# Patient Record
Sex: MALE | Race: WHITE | NOT HISPANIC OR LATINO | Employment: FULL TIME | URBAN - METROPOLITAN AREA
[De-identification: names, ages, dates, MRNs, and addresses within clinical notes are randomized per-mention and may not be internally consistent; named-entity substitution may affect disease eponyms.]

---

## 2022-05-11 ENCOUNTER — OFFICE VISIT (OUTPATIENT)
Dept: INTERNAL MEDICINE | Facility: CLINIC | Age: 52
End: 2022-05-11
Payer: COMMERCIAL

## 2022-05-11 ENCOUNTER — LAB VISIT (OUTPATIENT)
Dept: LAB | Facility: HOSPITAL | Age: 52
End: 2022-05-11
Attending: INTERNAL MEDICINE
Payer: COMMERCIAL

## 2022-05-11 VITALS
OXYGEN SATURATION: 98 % | HEIGHT: 66 IN | WEIGHT: 158.31 LBS | HEART RATE: 87 BPM | TEMPERATURE: 96 F | BODY MASS INDEX: 25.44 KG/M2 | SYSTOLIC BLOOD PRESSURE: 112 MMHG | DIASTOLIC BLOOD PRESSURE: 84 MMHG

## 2022-05-11 DIAGNOSIS — Z00.00 ROUTINE GENERAL MEDICAL EXAMINATION AT A HEALTH CARE FACILITY: ICD-10-CM

## 2022-05-11 DIAGNOSIS — Z00.00 ROUTINE GENERAL MEDICAL EXAMINATION AT A HEALTH CARE FACILITY: Primary | ICD-10-CM

## 2022-05-11 DIAGNOSIS — B35.1 ONYCHOMYCOSIS: ICD-10-CM

## 2022-05-11 DIAGNOSIS — L98.9 LESION OF SKIN OF NOSE: ICD-10-CM

## 2022-05-11 LAB
ALBUMIN SERPL BCP-MCNC: 4.3 G/DL (ref 3.5–5.2)
ALP SERPL-CCNC: 56 U/L (ref 55–135)
ALT SERPL W/O P-5'-P-CCNC: 34 U/L (ref 10–44)
ANION GAP SERPL CALC-SCNC: 8 MMOL/L (ref 8–16)
AST SERPL-CCNC: 32 U/L (ref 10–40)
BASOPHILS # BLD AUTO: 0.02 K/UL (ref 0–0.2)
BASOPHILS NFR BLD: 0.5 % (ref 0–1.9)
BILIRUB SERPL-MCNC: 0.5 MG/DL (ref 0.1–1)
BUN SERPL-MCNC: 22 MG/DL (ref 6–20)
CALCIUM SERPL-MCNC: 9.3 MG/DL (ref 8.7–10.5)
CHLORIDE SERPL-SCNC: 106 MMOL/L (ref 95–110)
CHOLEST SERPL-MCNC: 227 MG/DL (ref 120–199)
CHOLEST/HDLC SERPL: 4 {RATIO} (ref 2–5)
CO2 SERPL-SCNC: 28 MMOL/L (ref 23–29)
CREAT SERPL-MCNC: 1 MG/DL (ref 0.5–1.4)
DIFFERENTIAL METHOD: ABNORMAL
EOSINOPHIL # BLD AUTO: 0.1 K/UL (ref 0–0.5)
EOSINOPHIL NFR BLD: 1.8 % (ref 0–8)
ERYTHROCYTE [DISTWIDTH] IN BLOOD BY AUTOMATED COUNT: 12 % (ref 11.5–14.5)
EST. GFR  (AFRICAN AMERICAN): >60 ML/MIN/1.73 M^2
EST. GFR  (NON AFRICAN AMERICAN): >60 ML/MIN/1.73 M^2
GLUCOSE SERPL-MCNC: 99 MG/DL (ref 70–110)
HCT VFR BLD AUTO: 43.4 % (ref 40–54)
HDLC SERPL-MCNC: 57 MG/DL (ref 40–75)
HDLC SERPL: 25.1 % (ref 20–50)
HGB BLD-MCNC: 14.3 G/DL (ref 14–18)
IMM GRANULOCYTES # BLD AUTO: 0.01 K/UL (ref 0–0.04)
IMM GRANULOCYTES NFR BLD AUTO: 0.3 % (ref 0–0.5)
LDLC SERPL CALC-MCNC: 159.6 MG/DL (ref 63–159)
LYMPHOCYTES # BLD AUTO: 2 K/UL (ref 1–4.8)
LYMPHOCYTES NFR BLD: 52.1 % (ref 18–48)
MCH RBC QN AUTO: 30.7 PG (ref 27–31)
MCHC RBC AUTO-ENTMCNC: 32.9 G/DL (ref 32–36)
MCV RBC AUTO: 93 FL (ref 82–98)
MONOCYTES # BLD AUTO: 0.3 K/UL (ref 0.3–1)
MONOCYTES NFR BLD: 7.5 % (ref 4–15)
NEUTROPHILS # BLD AUTO: 1.5 K/UL (ref 1.8–7.7)
NEUTROPHILS NFR BLD: 37.8 % (ref 38–73)
NONHDLC SERPL-MCNC: 170 MG/DL
NRBC BLD-RTO: 0 /100 WBC
PLATELET # BLD AUTO: 243 K/UL (ref 150–450)
PMV BLD AUTO: 9.8 FL (ref 9.2–12.9)
POTASSIUM SERPL-SCNC: 4.7 MMOL/L (ref 3.5–5.1)
PROT SERPL-MCNC: 6.7 G/DL (ref 6–8.4)
RBC # BLD AUTO: 4.66 M/UL (ref 4.6–6.2)
SODIUM SERPL-SCNC: 142 MMOL/L (ref 136–145)
TESTOST SERPL-MCNC: 651 NG/DL (ref 304–1227)
TRIGL SERPL-MCNC: 52 MG/DL (ref 30–150)
TSH SERPL DL<=0.005 MIU/L-ACNC: 1.42 UIU/ML (ref 0.4–4)
WBC # BLD AUTO: 3.88 K/UL (ref 3.9–12.7)

## 2022-05-11 PROCEDURE — 80061 LIPID PANEL: CPT | Performed by: INTERNAL MEDICINE

## 2022-05-11 PROCEDURE — 99203 OFFICE O/P NEW LOW 30 MIN: CPT | Mod: 25,S$GLB,, | Performed by: INTERNAL MEDICINE

## 2022-05-11 PROCEDURE — 85025 COMPLETE CBC W/AUTO DIFF WBC: CPT | Performed by: INTERNAL MEDICINE

## 2022-05-11 PROCEDURE — 1159F PR MEDICATION LIST DOCUMENTED IN MEDICAL RECORD: ICD-10-PCS | Mod: CPTII,S$GLB,, | Performed by: INTERNAL MEDICINE

## 2022-05-11 PROCEDURE — 36415 COLL VENOUS BLD VENIPUNCTURE: CPT | Performed by: INTERNAL MEDICINE

## 2022-05-11 PROCEDURE — 84443 ASSAY THYROID STIM HORMONE: CPT | Performed by: INTERNAL MEDICINE

## 2022-05-11 PROCEDURE — 86803 HEPATITIS C AB TEST: CPT | Performed by: INTERNAL MEDICINE

## 2022-05-11 PROCEDURE — 99203 PR OFFICE/OUTPT VISIT, NEW, LEVL III, 30-44 MIN: ICD-10-PCS | Mod: 25,S$GLB,, | Performed by: INTERNAL MEDICINE

## 2022-05-11 PROCEDURE — 99999 PR PBB SHADOW E&M-NEW PATIENT-LVL IV: CPT | Mod: PBBFAC,,, | Performed by: INTERNAL MEDICINE

## 2022-05-11 PROCEDURE — 3074F PR MOST RECENT SYSTOLIC BLOOD PRESSURE < 130 MM HG: ICD-10-PCS | Mod: CPTII,S$GLB,, | Performed by: INTERNAL MEDICINE

## 2022-05-11 PROCEDURE — 3008F PR BODY MASS INDEX (BMI) DOCUMENTED: ICD-10-PCS | Mod: CPTII,S$GLB,, | Performed by: INTERNAL MEDICINE

## 2022-05-11 PROCEDURE — 99386 PR PREVENTIVE VISIT,NEW,40-64: ICD-10-PCS | Mod: 25,S$GLB,, | Performed by: INTERNAL MEDICINE

## 2022-05-11 PROCEDURE — 3079F PR MOST RECENT DIASTOLIC BLOOD PRESSURE 80-89 MM HG: ICD-10-PCS | Mod: CPTII,S$GLB,, | Performed by: INTERNAL MEDICINE

## 2022-05-11 PROCEDURE — 87389 HIV-1 AG W/HIV-1&-2 AB AG IA: CPT | Performed by: INTERNAL MEDICINE

## 2022-05-11 PROCEDURE — 99386 PREV VISIT NEW AGE 40-64: CPT | Mod: 25,S$GLB,, | Performed by: INTERNAL MEDICINE

## 2022-05-11 PROCEDURE — 3079F DIAST BP 80-89 MM HG: CPT | Mod: CPTII,S$GLB,, | Performed by: INTERNAL MEDICINE

## 2022-05-11 PROCEDURE — 1159F MED LIST DOCD IN RCRD: CPT | Mod: CPTII,S$GLB,, | Performed by: INTERNAL MEDICINE

## 2022-05-11 PROCEDURE — 99999 PR PBB SHADOW E&M-NEW PATIENT-LVL IV: ICD-10-PCS | Mod: PBBFAC,,, | Performed by: INTERNAL MEDICINE

## 2022-05-11 PROCEDURE — 84403 ASSAY OF TOTAL TESTOSTERONE: CPT | Performed by: INTERNAL MEDICINE

## 2022-05-11 PROCEDURE — 80053 COMPREHEN METABOLIC PANEL: CPT | Performed by: INTERNAL MEDICINE

## 2022-05-11 PROCEDURE — 3008F BODY MASS INDEX DOCD: CPT | Mod: CPTII,S$GLB,, | Performed by: INTERNAL MEDICINE

## 2022-05-11 PROCEDURE — 3074F SYST BP LT 130 MM HG: CPT | Mod: CPTII,S$GLB,, | Performed by: INTERNAL MEDICINE

## 2022-05-11 RX ORDER — ASPIRIN 81 MG/1
81 TABLET ORAL DAILY
COMMUNITY

## 2022-05-11 RX ORDER — ASCORBIC ACID 500 MG
500 TABLET ORAL DAILY
COMMUNITY

## 2022-05-11 RX ORDER — CHOLECALCIFEROL (VITAMIN D3) 25 MCG
1000 TABLET ORAL DAILY
COMMUNITY

## 2022-05-11 NOTE — PROGRESS NOTES
Subjective:      Patient ID: Lenny Quezada is a 51 y.o. male.    Chief Complaint: Annual Exam    HPI     52 yo with There is no problem list on file for this patient.    History reviewed. No pertinent past medical history.    Here today for annual prev exam.  Compliant with meds without significant side effects. Energy and appetite are good.     Also has multiple concerns:      Red spot to nose unchanged for months.   Denies trauma    A he also reports that he has had toenail fungus to his bilateral great toes for years.  No pain or tenderness.    He reports that he has a cardiac evaluation scheduled for May 24th.    He reports that he is up-to-date with his colonoscopy and repeat is due at 54 years old.    He also reports that he has had some intermittent insomnia.  Difficulty falling asleep.  Has some mind racing.    Past Surgical History:   Procedure Laterality Date    VASECTOMY  2011     Social History     Socioeconomic History    Marital status:    Tobacco Use    Smoking status: Never Smoker    Smokeless tobacco: Never Used   Substance and Sexual Activity    Alcohol use: Yes     Alcohol/week: 2.0 standard drinks     Types: 2 Cans of beer per week    Drug use: Never    Sexual activity: Yes     Partners: Female     Birth control/protection: None     family history includes Brain cancer in his brother; Cancer in his brother; Heart disease in his father; Hypertension in his brother; Prostate cancer in his father.  Father with coronary artery disease diagnosed at 36.     Review of Systems   Constitutional: Negative for activity change and unexpected weight change.   HENT: Negative for hearing loss, rhinorrhea and trouble swallowing.    Eyes: Negative for discharge and visual disturbance.   Respiratory: Negative for wheezing.    Cardiovascular: Negative for palpitations.   Gastrointestinal: Negative for blood in stool, constipation, diarrhea and vomiting.   Endocrine: Negative for polydipsia.  "  Genitourinary: Negative for difficulty urinating, hematuria and urgency.   Musculoskeletal: Negative for arthralgias, joint swelling and neck pain.   Neurological: Negative for weakness and headaches.   Psychiatric/Behavioral: Negative for confusion and dysphoric mood.     Objective:   /84 (BP Location: Right arm, Patient Position: Sitting, BP Method: Large (Manual))   Pulse 87   Temp 96 °F (35.6 °C) (Tympanic)   Ht 5' 6" (1.676 m)   Wt 71.8 kg (158 lb 4.6 oz)   SpO2 98%   BMI 25.55 kg/m²     Physical Exam  Constitutional:       General: He is not in acute distress.     Appearance: He is well-developed.   HENT:      Head: Normocephalic and atraumatic.   Eyes:      Pupils: Pupils are equal, round, and reactive to light.   Neck:      Thyroid: No thyromegaly.   Cardiovascular:      Rate and Rhythm: Normal rate and regular rhythm.   Pulmonary:      Breath sounds: Normal breath sounds. No wheezing or rales.   Abdominal:      General: Bowel sounds are normal.      Palpations: Abdomen is soft.      Tenderness: There is no abdominal tenderness.   Musculoskeletal:      Cervical back: Neck supple.   Lymphadenopathy:      Cervical: No cervical adenopathy.   Skin:     General: Skin is warm and dry.   Neurological:      Mental Status: He is alert and oriented to person, place, and time.   Psychiatric:         Behavior: Behavior normal.       Onychomycosis noted to toenails of bilateral great toes.  No discharge.  No tenderness.  No redness.    Assessment:     1. Routine general medical examination at a health care facility    2. Onychomycosis    3. Lesion of skin of nose      Plan:   Routine general medical examination at a health care facility  Heart healthy diet and regular exercise.  Health maintenance reviewed patient  -     Comprehensive Metabolic Panel; Future; Expected date: 05/11/2022  -     CBC Auto Differential; Future; Expected date: 05/11/2022  -     TSH; Future; Expected date: 05/11/2022  -     Lipid " Panel; Future; Expected date: 05/11/2022  -     Hepatitis C Antibody; Future; Expected date: 05/11/2022  -     HIV 1/2 Ag/Ab (4th Gen); Future; Expected date: 05/11/2022  -     Testosterone; Future; Expected date: 05/11/2022    Onychomycosis  He reports minimal effect with previously tried oral medication.  He would like a topical.  -     efinaconazole (JUBLIA) 10 % Felicity; Apply 1 application topically Daily. For up to 48 weeks.  Dispense: 8 mL; Refill: 0    Lesion of skin of nose  Pinpoint erythematous circular lesion to tip of nose.  -     Ambulatory referral/consult to Dermatology; Future; Expected date: 05/18/2022      Insomnia.  Discussed sleep hygiene.      Problem List Items Addressed This Visit    None     Visit Diagnoses     Routine general medical examination at a health care facility    -  Primary    Relevant Orders    Comprehensive Metabolic Panel (Completed)    CBC Auto Differential (Completed)    TSH (Completed)    Lipid Panel (Completed)    Hepatitis C Antibody    HIV 1/2 Ag/Ab (4th Gen)    Testosterone (Completed)    Onychomycosis        Relevant Medications    efinaconazole (JUBLIA) 10 % Felicity    Lesion of skin of nose        Relevant Orders    Ambulatory referral/consult to Dermatology          Follow up in about 1 year (around 5/11/2023), or if symptoms worsen or fail to improve.

## 2022-05-16 ENCOUNTER — PATIENT MESSAGE (OUTPATIENT)
Dept: ADMINISTRATIVE | Facility: HOSPITAL | Age: 52
End: 2022-05-16
Payer: COMMERCIAL

## 2022-05-16 LAB — HIV 1+2 AB+HIV1 P24 AG SERPL QL IA: NEGATIVE

## 2022-05-17 LAB — HCV AB SERPL QL IA: NEGATIVE

## 2022-06-02 ENCOUNTER — OFFICE VISIT (OUTPATIENT)
Dept: DERMATOLOGY | Facility: CLINIC | Age: 52
End: 2022-06-02
Payer: COMMERCIAL

## 2022-06-02 DIAGNOSIS — L81.4 LENTIGO: ICD-10-CM

## 2022-06-02 DIAGNOSIS — D22.9 MULTIPLE NEVI: ICD-10-CM

## 2022-06-02 DIAGNOSIS — L82.1 SEBORRHEIC KERATOSES: Primary | ICD-10-CM

## 2022-06-02 DIAGNOSIS — I78.1 TELANGIECTASIA: ICD-10-CM

## 2022-06-02 DIAGNOSIS — L98.9 LESION OF SKIN OF NOSE: ICD-10-CM

## 2022-06-02 PROCEDURE — 99999 PR PBB SHADOW E&M-EST. PATIENT-LVL III: ICD-10-PCS | Mod: PBBFAC,,, | Performed by: PHYSICIAN ASSISTANT

## 2022-06-02 PROCEDURE — 99999 PR PBB SHADOW E&M-EST. PATIENT-LVL III: CPT | Mod: PBBFAC,,, | Performed by: PHYSICIAN ASSISTANT

## 2022-06-02 PROCEDURE — 1160F RVW MEDS BY RX/DR IN RCRD: CPT | Mod: CPTII,S$GLB,, | Performed by: PHYSICIAN ASSISTANT

## 2022-06-02 PROCEDURE — 99202 OFFICE O/P NEW SF 15 MIN: CPT | Mod: S$GLB,,, | Performed by: PHYSICIAN ASSISTANT

## 2022-06-02 PROCEDURE — 1159F MED LIST DOCD IN RCRD: CPT | Mod: CPTII,S$GLB,, | Performed by: PHYSICIAN ASSISTANT

## 2022-06-02 PROCEDURE — 1159F PR MEDICATION LIST DOCUMENTED IN MEDICAL RECORD: ICD-10-PCS | Mod: CPTII,S$GLB,, | Performed by: PHYSICIAN ASSISTANT

## 2022-06-02 PROCEDURE — 1160F PR REVIEW ALL MEDS BY PRESCRIBER/CLIN PHARMACIST DOCUMENTED: ICD-10-PCS | Mod: CPTII,S$GLB,, | Performed by: PHYSICIAN ASSISTANT

## 2022-06-02 PROCEDURE — 99202 PR OFFICE/OUTPT VISIT, NEW, LEVL II, 15-29 MIN: ICD-10-PCS | Mod: S$GLB,,, | Performed by: PHYSICIAN ASSISTANT

## 2022-06-02 NOTE — PROGRESS NOTES
Subjective:       Patient ID:  Lenny Quezada is a 52 y.o. male who presents for   Chief Complaint   Patient presents with    Spot     C/o red spot on nose, no hx of skin cancer     History of Present Illness: The patient presents with chief complaint of spot.  Location: nose  Duration: 5 months  Signs/Symptoms: redness. Denies dryness, peeling, tenderness, change in size, or bleeding.     Prior treatments: none    Denies personal or FHX of skin CA      Review of Systems   Constitutional: Negative for fever and chills.   Gastrointestinal: Negative for nausea and vomiting.   Skin: Positive for activity-related sunscreen use. Negative for itching, rash, dry skin, sun sensitivity, daily sunscreen use, recent sunburn, dry lips and abscesses.   Hematologic/Lymphatic: Does not bruise/bleed easily.        Objective:    Physical Exam   Constitutional: He appears well-developed and well-nourished. No distress.   HENT:   Head:       Neurological: He is alert and oriented to person, place, and time. He is not disoriented.   Psychiatric: He has a normal mood and affect.   Skin:   Areas Examined (abnormalities noted in diagram):   Head / Face Inspection Performed  Neck Inspection Performed  Chest / Axilla Inspection Performed  Back Inspection Performed  RUE Inspected  LUE Inspection Performed                   Diagram Legend     Erythematous scaling macule/papule c/w actinic keratosis       Vascular papule c/w angioma      Pigmented verrucoid papule/plaque c/w seborrheic keratosis      Yellow umbilicated papule c/w sebaceous hyperplasia      Irregularly shaped tan macule c/w lentigo     1-2 mm smooth white papules consistent with Milia      Movable subcutaneous cyst with punctum c/w epidermal inclusion cyst      Subcutaneous movable cyst c/w pilar cyst      Firm pink to brown papule c/w dermatofibroma      Pedunculated fleshy papule(s) c/w skin tag(s)      Evenly pigmented macule c/w junctional nevus     Mildly variegated  pigmented, slightly irregular-bordered macule c/w mildly atypical nevus      Flesh colored to evenly pigmented papule c/w intradermal nevus       Pink pearly papule/plaque c/w basal cell carcinoma      Erythematous hyperkeratotic cursted plaque c/w SCC      Surgical scar with no sign of skin cancer recurrence      Open and closed comedones      Inflammatory papules and pustules      Verrucoid papule consistent consistent with wart     Erythematous eczematous patches and plaques     Dystrophic onycholytic nail with subungual debris c/w onychomycosis     Umbilicated papule    Erythematous-base heme-crusted tan verrucoid plaque consistent with inflamed seborrheic keratosis     Erythematous Silvery Scaling Plaque c/w Psoriasis     See annotation      Assessment / Plan:        Seborrheic keratoses  Reassurance given.  Lesions are benign.    Lesion of skin of nose  -     Ambulatory referral/consult to Dermatology    Telangiectasia  Discussed dx and provided reassurance. Encouraged daily spf.    Lentigo  Encouraged photoprotection daily for sun exposed skin.    Multiple nevi  Reviewed ABCDEFs of moles, encouraged regular         Follow up in about 1 year (around 6/2/2023) for Full Skin Exam.

## 2022-11-30 ENCOUNTER — PATIENT MESSAGE (OUTPATIENT)
Dept: INTERNAL MEDICINE | Facility: CLINIC | Age: 52
End: 2022-11-30
Payer: COMMERCIAL

## 2022-12-14 ENCOUNTER — TELEPHONE (OUTPATIENT)
Dept: INTERNAL MEDICINE | Facility: CLINIC | Age: 52
End: 2022-12-14
Payer: COMMERCIAL

## 2022-12-14 DIAGNOSIS — B35.1 ONYCHOMYCOSIS: Primary | ICD-10-CM

## 2022-12-14 NOTE — TELEPHONE ENCOUNTER
----- Message from Lexi Hernandez sent at 12/14/2022 10:19 AM CST -----  Pt would like a call back regarding efinaconazole (JUBLIA) 10 % Felicity that was prescribed. Pt stated the cost is $1000 and he would like the pills instead. Call pt back at [phone] to advise. Fly. EL

## 2022-12-20 RX ORDER — CICLOPIROX 80 MG/ML
SOLUTION TOPICAL
Qty: 6.6 ML | Refills: 0 | Status: SHIPPED | OUTPATIENT
Start: 2022-12-20 | End: 2023-02-06

## 2022-12-21 ENCOUNTER — PATIENT MESSAGE (OUTPATIENT)
Dept: INTERNAL MEDICINE | Facility: CLINIC | Age: 52
End: 2022-12-21
Payer: COMMERCIAL

## 2022-12-21 NOTE — TELEPHONE ENCOUNTER
Sent ciclopirox topical which may be cheaper than jublia. Let me know if oral medication is needed.

## 2023-04-03 DIAGNOSIS — B35.1 ONYCHOMYCOSIS: ICD-10-CM

## 2023-04-04 RX ORDER — CICLOPIROX 80 MG/ML
SOLUTION TOPICAL
Qty: 6.6 ML | Refills: 0 | Status: SHIPPED | OUTPATIENT
Start: 2023-04-04 | End: 2023-05-12

## 2023-05-12 DIAGNOSIS — B35.1 ONYCHOMYCOSIS: ICD-10-CM

## 2023-05-12 RX ORDER — CICLOPIROX 80 MG/ML
SOLUTION TOPICAL
Qty: 6.6 ML | Refills: 0 | Status: SHIPPED | OUTPATIENT
Start: 2023-05-12 | End: 2023-07-03

## 2023-05-12 NOTE — TELEPHONE ENCOUNTER
Refill Routing Note   Medication(s) are not appropriate for processing by [facility] for the following reason(s):      Medication outside of protocol    [facility] action(s):  Route None identified          Appointments  past 12m or future 3m with PCP    Date Provider   Last Visit   5/11/2022 Edward Flores MD   Next Visit   Visit date not found Edward Flores MD   ED visits in past 90 days: 0        Note composed:3:21 PM 05/12/2023

## 2023-06-06 ENCOUNTER — LAB VISIT (OUTPATIENT)
Dept: LAB | Facility: HOSPITAL | Age: 53
End: 2023-06-06
Attending: INTERNAL MEDICINE
Payer: COMMERCIAL

## 2023-06-06 ENCOUNTER — OFFICE VISIT (OUTPATIENT)
Dept: INTERNAL MEDICINE | Facility: CLINIC | Age: 53
End: 2023-06-06
Payer: COMMERCIAL

## 2023-06-06 VITALS
WEIGHT: 160.06 LBS | BODY MASS INDEX: 25.72 KG/M2 | SYSTOLIC BLOOD PRESSURE: 118 MMHG | HEART RATE: 69 BPM | TEMPERATURE: 96 F | HEIGHT: 66 IN | DIASTOLIC BLOOD PRESSURE: 86 MMHG | OXYGEN SATURATION: 98 %

## 2023-06-06 DIAGNOSIS — Z00.00 ROUTINE GENERAL MEDICAL EXAMINATION AT A HEALTH CARE FACILITY: Primary | ICD-10-CM

## 2023-06-06 DIAGNOSIS — Z00.00 ROUTINE GENERAL MEDICAL EXAMINATION AT A HEALTH CARE FACILITY: ICD-10-CM

## 2023-06-06 DIAGNOSIS — B35.1 ONYCHOMYCOSIS: ICD-10-CM

## 2023-06-06 DIAGNOSIS — Z82.49 FAMILY HISTORY OF EARLY CAD: ICD-10-CM

## 2023-06-06 DIAGNOSIS — E78.5 HYPERLIPIDEMIA, UNSPECIFIED HYPERLIPIDEMIA TYPE: ICD-10-CM

## 2023-06-06 LAB
ALBUMIN SERPL BCP-MCNC: 4.2 G/DL (ref 3.5–5.2)
ALP SERPL-CCNC: 73 U/L (ref 55–135)
ALT SERPL W/O P-5'-P-CCNC: 42 U/L (ref 10–44)
ANION GAP SERPL CALC-SCNC: 8 MMOL/L (ref 8–16)
AST SERPL-CCNC: 32 U/L (ref 10–40)
BASOPHILS # BLD AUTO: 0.03 K/UL (ref 0–0.2)
BASOPHILS NFR BLD: 0.6 % (ref 0–1.9)
BILIRUB SERPL-MCNC: 0.5 MG/DL (ref 0.1–1)
BUN SERPL-MCNC: 17 MG/DL (ref 6–20)
CALCIUM SERPL-MCNC: 9.4 MG/DL (ref 8.7–10.5)
CHLORIDE SERPL-SCNC: 105 MMOL/L (ref 95–110)
CHOLEST SERPL-MCNC: 175 MG/DL (ref 120–199)
CHOLEST/HDLC SERPL: 3.2 {RATIO} (ref 2–5)
CO2 SERPL-SCNC: 27 MMOL/L (ref 23–29)
COMPLEXED PSA SERPL-MCNC: 1.1 NG/ML (ref 0–4)
CREAT SERPL-MCNC: 1 MG/DL (ref 0.5–1.4)
DIFFERENTIAL METHOD: NORMAL
EOSINOPHIL # BLD AUTO: 0.2 K/UL (ref 0–0.5)
EOSINOPHIL NFR BLD: 3.1 % (ref 0–8)
ERYTHROCYTE [DISTWIDTH] IN BLOOD BY AUTOMATED COUNT: 12.3 % (ref 11.5–14.5)
EST. GFR  (NO RACE VARIABLE): >60 ML/MIN/1.73 M^2
ESTIMATED AVG GLUCOSE: 103 MG/DL (ref 68–131)
GLUCOSE SERPL-MCNC: 97 MG/DL (ref 70–110)
HBA1C MFR BLD: 5.2 % (ref 4–5.6)
HCT VFR BLD AUTO: 45.2 % (ref 40–54)
HDLC SERPL-MCNC: 54 MG/DL (ref 40–75)
HDLC SERPL: 30.9 % (ref 20–50)
HGB BLD-MCNC: 14.6 G/DL (ref 14–18)
IMM GRANULOCYTES # BLD AUTO: 0.01 K/UL (ref 0–0.04)
IMM GRANULOCYTES NFR BLD AUTO: 0.2 % (ref 0–0.5)
LDLC SERPL CALC-MCNC: 110.4 MG/DL (ref 63–159)
LYMPHOCYTES # BLD AUTO: 2.6 K/UL (ref 1–4.8)
LYMPHOCYTES NFR BLD: 47 % (ref 18–48)
MCH RBC QN AUTO: 30.4 PG (ref 27–31)
MCHC RBC AUTO-ENTMCNC: 32.3 G/DL (ref 32–36)
MCV RBC AUTO: 94 FL (ref 82–98)
MONOCYTES # BLD AUTO: 0.4 K/UL (ref 0.3–1)
MONOCYTES NFR BLD: 7.2 % (ref 4–15)
NEUTROPHILS # BLD AUTO: 2.3 K/UL (ref 1.8–7.7)
NEUTROPHILS NFR BLD: 41.9 % (ref 38–73)
NONHDLC SERPL-MCNC: 121 MG/DL
NRBC BLD-RTO: 0 /100 WBC
PLATELET # BLD AUTO: 244 K/UL (ref 150–450)
PMV BLD AUTO: 10 FL (ref 9.2–12.9)
POTASSIUM SERPL-SCNC: 4.4 MMOL/L (ref 3.5–5.1)
PROT SERPL-MCNC: 6.9 G/DL (ref 6–8.4)
RBC # BLD AUTO: 4.81 M/UL (ref 4.6–6.2)
SODIUM SERPL-SCNC: 140 MMOL/L (ref 136–145)
TRIGL SERPL-MCNC: 53 MG/DL (ref 30–150)
TSH SERPL DL<=0.005 MIU/L-ACNC: 1.1 UIU/ML (ref 0.4–4)
WBC # BLD AUTO: 5.43 K/UL (ref 3.9–12.7)

## 2023-06-06 PROCEDURE — 99396 PREV VISIT EST AGE 40-64: CPT | Mod: S$GLB,,, | Performed by: INTERNAL MEDICINE

## 2023-06-06 PROCEDURE — 1159F MED LIST DOCD IN RCRD: CPT | Mod: CPTII,S$GLB,, | Performed by: INTERNAL MEDICINE

## 2023-06-06 PROCEDURE — 3008F PR BODY MASS INDEX (BMI) DOCUMENTED: ICD-10-PCS | Mod: CPTII,S$GLB,, | Performed by: INTERNAL MEDICINE

## 2023-06-06 PROCEDURE — 3074F SYST BP LT 130 MM HG: CPT | Mod: CPTII,S$GLB,, | Performed by: INTERNAL MEDICINE

## 2023-06-06 PROCEDURE — 85025 COMPLETE CBC W/AUTO DIFF WBC: CPT | Performed by: INTERNAL MEDICINE

## 2023-06-06 PROCEDURE — 84153 ASSAY OF PSA TOTAL: CPT | Performed by: INTERNAL MEDICINE

## 2023-06-06 PROCEDURE — 83036 HEMOGLOBIN GLYCOSYLATED A1C: CPT | Performed by: INTERNAL MEDICINE

## 2023-06-06 PROCEDURE — 3079F DIAST BP 80-89 MM HG: CPT | Mod: CPTII,S$GLB,, | Performed by: INTERNAL MEDICINE

## 2023-06-06 PROCEDURE — 80053 COMPREHEN METABOLIC PANEL: CPT | Performed by: INTERNAL MEDICINE

## 2023-06-06 PROCEDURE — 3079F PR MOST RECENT DIASTOLIC BLOOD PRESSURE 80-89 MM HG: ICD-10-PCS | Mod: CPTII,S$GLB,, | Performed by: INTERNAL MEDICINE

## 2023-06-06 PROCEDURE — 3008F BODY MASS INDEX DOCD: CPT | Mod: CPTII,S$GLB,, | Performed by: INTERNAL MEDICINE

## 2023-06-06 PROCEDURE — 3074F PR MOST RECENT SYSTOLIC BLOOD PRESSURE < 130 MM HG: ICD-10-PCS | Mod: CPTII,S$GLB,, | Performed by: INTERNAL MEDICINE

## 2023-06-06 PROCEDURE — 99396 PR PREVENTIVE VISIT,EST,40-64: ICD-10-PCS | Mod: S$GLB,,, | Performed by: INTERNAL MEDICINE

## 2023-06-06 PROCEDURE — 36415 COLL VENOUS BLD VENIPUNCTURE: CPT | Performed by: INTERNAL MEDICINE

## 2023-06-06 PROCEDURE — 80061 LIPID PANEL: CPT | Performed by: INTERNAL MEDICINE

## 2023-06-06 PROCEDURE — 99999 PR PBB SHADOW E&M-EST. PATIENT-LVL IV: ICD-10-PCS | Mod: PBBFAC,,, | Performed by: INTERNAL MEDICINE

## 2023-06-06 PROCEDURE — 84443 ASSAY THYROID STIM HORMONE: CPT | Performed by: INTERNAL MEDICINE

## 2023-06-06 PROCEDURE — 1159F PR MEDICATION LIST DOCUMENTED IN MEDICAL RECORD: ICD-10-PCS | Mod: CPTII,S$GLB,, | Performed by: INTERNAL MEDICINE

## 2023-06-06 PROCEDURE — 99999 PR PBB SHADOW E&M-EST. PATIENT-LVL IV: CPT | Mod: PBBFAC,,, | Performed by: INTERNAL MEDICINE

## 2023-06-06 RX ORDER — CALCIUM CARBONATE 300MG(750)
TABLET,CHEWABLE ORAL
COMMUNITY
Start: 2022-09-01

## 2023-06-06 NOTE — PROGRESS NOTES
Subjective:      Patient ID: Lenny Quezada is a 53 y.o. male.    Chief Complaint: Annual Exam    HPI      53 y.o. with  Patient Active Problem List   Diagnosis    Routine general medical examination at a health care facility    Hyperlipidemia    Family history of early CAD     History reviewed. No pertinent past medical history.    Here today for annual prev exam.  Compliant with meds without significant side effects. Energy and appetite are good.         Past Surgical History:   Procedure Laterality Date    VASECTOMY  2011     Social History     Socioeconomic History    Marital status:    Tobacco Use    Smoking status: Never    Smokeless tobacco: Never   Substance and Sexual Activity    Alcohol use: Yes     Alcohol/week: 4.0 standard drinks     Types: 4 Cans of beer per week    Drug use: Never    Sexual activity: Yes     Partners: Female     Birth control/protection: None     family history includes Brain cancer in his brother; Cancer in his brother and father; Heart disease in his father; Hypertension in his brother; Prostate cancer in his father.    Review of Systems   Constitutional:  Negative for activity change and unexpected weight change.   HENT:  Negative for hearing loss, rhinorrhea and trouble swallowing.    Eyes:  Negative for discharge and visual disturbance.   Respiratory:  Negative for chest tightness and wheezing.    Cardiovascular:  Negative for palpitations.   Gastrointestinal:  Negative for blood in stool, constipation, diarrhea and vomiting.   Endocrine: Negative for polydipsia and polyuria.   Genitourinary:  Negative for difficulty urinating, hematuria and urgency.   Musculoskeletal:  Negative for joint swelling and neck pain.   Neurological:  Negative for weakness and headaches.   Psychiatric/Behavioral:  Negative for confusion and dysphoric mood.    Objective:   /86 (BP Location: Left arm, Patient Position: Sitting, BP Method: Large (Manual))   Pulse 69   Temp 96.2 °F (35.7  "°C) (Tympanic)   Ht 5' 6" (1.676 m)   Wt 72.6 kg (160 lb 0.9 oz)   SpO2 98%   BMI 25.83 kg/m²     Physical Exam  Constitutional:       General: He is not in acute distress.     Appearance: He is well-developed.   HENT:      Head: Normocephalic and atraumatic.   Eyes:      Extraocular Movements: Extraocular movements intact.   Neck:      Thyroid: No thyromegaly.   Cardiovascular:      Rate and Rhythm: Normal rate and regular rhythm.   Pulmonary:      Breath sounds: Normal breath sounds. No wheezing or rales.   Abdominal:      General: Bowel sounds are normal.      Palpations: Abdomen is soft.      Tenderness: There is no abdominal tenderness.   Musculoskeletal:         General: No swelling.      Cervical back: Neck supple. No rigidity.   Lymphadenopathy:      Cervical: No cervical adenopathy.   Skin:     General: Skin is warm and dry.   Neurological:      Mental Status: He is alert and oriented to person, place, and time.   Psychiatric:         Behavior: Behavior normal.       Lab Results   Component Value Date    WBC 3.88 (L) 05/11/2022    HGB 14.3 05/11/2022    HCT 43.4 05/11/2022    MCV 93 05/11/2022     05/11/2022    CHOL 227 (H) 05/11/2022    TRIG 52 05/11/2022    HDL 57 05/11/2022    LDLCALC 159.6 (H) 05/11/2022    ALT 34 05/11/2022    AST 32 05/11/2022     05/11/2022    K 4.7 05/11/2022    CALCIUM 9.3 05/11/2022     05/11/2022    CO2 28 05/11/2022    BUN 22 (H) 05/11/2022    CREATININE 1.0 05/11/2022    TSH 1.415 05/11/2022    GLU 99 05/11/2022    TOTALTESTOST 651 05/11/2022          The 10-year ASCVD risk score (Dane TORO, et al., 2019) is: 4.6%    Values used to calculate the score:      Age: 53 years      Sex: Male      Is Non- : No      Diabetic: No      Tobacco smoker: No      Systolic Blood Pressure: 118 mmHg      Is BP treated: No      HDL Cholesterol: 57 mg/dL      Total Cholesterol: 227 mg/dL     Assessment:     1. Routine general medical examination at a " health care facility    2. Hyperlipidemia, unspecified hyperlipidemia type    3. Onychomycosis    4. Family history of early CAD      Plan:   1. Routine general medical examination at a health care facility  Overview:  Heart healthy diet, regular exercise, and regular use of sunscreen.    reviewed  He reports that he is up-to-date with his colonoscopy and repeat is due at 54 years old.  Reports calcium score of 6 with Dr. Pipe Flores in 2022.     Orders:  -     Comprehensive Metabolic Panel; Future; Expected date: 06/06/2023  -     CBC Auto Differential; Future; Expected date: 06/06/2023  -     TSH; Future; Expected date: 06/06/2023  -     Lipid Panel; Future; Expected date: 06/06/2023  -     Hemoglobin A1C; Future; Expected date: 06/06/2023  -     PSA, Screening; Future; Expected date: 06/06/2023    2. Hyperlipidemia, unspecified hyperlipidemia type  Overview:  Statin initiated by Dr. Pipe Flores 2022.       3. Onychomycosis    4. Family history of early CAD  Overview:  Father with MI in his 30s.       Reports improving with penlac    Patient Instructions   Check with your pharmacy regarding new shingles vaccine.      No future appointments.          Follow up in about 1 year (around 6/6/2024), or if symptoms worsen or fail to improve.  Please get last PN and coronary artery calcium score test from Dr. Pipe Flores, Cardiologist.

## 2023-06-16 ENCOUNTER — PATIENT MESSAGE (OUTPATIENT)
Dept: ADMINISTRATIVE | Facility: HOSPITAL | Age: 53
End: 2023-06-16
Payer: COMMERCIAL

## 2023-06-30 DIAGNOSIS — B35.1 ONYCHOMYCOSIS: ICD-10-CM

## 2023-07-03 RX ORDER — CICLOPIROX 80 MG/ML
SOLUTION TOPICAL
Qty: 6.6 ML | Refills: 0 | Status: SHIPPED | OUTPATIENT
Start: 2023-07-03 | End: 2023-08-22

## 2023-08-21 DIAGNOSIS — B35.1 ONYCHOMYCOSIS: ICD-10-CM

## 2023-08-22 RX ORDER — CICLOPIROX 80 MG/ML
SOLUTION TOPICAL
Qty: 6.6 ML | Refills: 1 | Status: SHIPPED | OUTPATIENT
Start: 2023-08-22

## 2023-09-11 PROBLEM — Z00.00 ROUTINE GENERAL MEDICAL EXAMINATION AT A HEALTH CARE FACILITY: Status: RESOLVED | Noted: 2023-06-06 | Resolved: 2023-09-11

## 2024-02-06 DIAGNOSIS — Z12.11 COLON CANCER SCREENING: ICD-10-CM

## 2025-04-03 ENCOUNTER — OFFICE VISIT (OUTPATIENT)
Dept: INTERNAL MEDICINE | Facility: CLINIC | Age: 55
End: 2025-04-03
Payer: COMMERCIAL

## 2025-04-03 VITALS
SYSTOLIC BLOOD PRESSURE: 116 MMHG | BODY MASS INDEX: 25.51 KG/M2 | OXYGEN SATURATION: 98 % | RESPIRATION RATE: 18 BRPM | DIASTOLIC BLOOD PRESSURE: 86 MMHG | TEMPERATURE: 98 F | HEIGHT: 66 IN | WEIGHT: 158.75 LBS | HEART RATE: 96 BPM

## 2025-04-03 DIAGNOSIS — B35.1 ONYCHOMYCOSIS OF TOENAIL: ICD-10-CM

## 2025-04-03 DIAGNOSIS — Z12.5 SCREENING PSA (PROSTATE SPECIFIC ANTIGEN): ICD-10-CM

## 2025-04-03 DIAGNOSIS — E78.00 PURE HYPERCHOLESTEROLEMIA: Chronic | ICD-10-CM

## 2025-04-03 DIAGNOSIS — Z00.00 PREVENTATIVE HEALTH CARE: Primary | ICD-10-CM

## 2025-04-03 DIAGNOSIS — M25.521 CHRONIC ELBOW PAIN, RIGHT: ICD-10-CM

## 2025-04-03 DIAGNOSIS — Z13.1 SCREENING FOR DIABETES MELLITUS: ICD-10-CM

## 2025-04-03 DIAGNOSIS — Z13.220 ENCOUNTER FOR LIPID SCREENING FOR CARDIOVASCULAR DISEASE: ICD-10-CM

## 2025-04-03 DIAGNOSIS — Z82.49 FAMILY HISTORY OF EARLY CAD: Chronic | ICD-10-CM

## 2025-04-03 DIAGNOSIS — Z23 NEED FOR VACCINATION: ICD-10-CM

## 2025-04-03 DIAGNOSIS — Z13.6 ENCOUNTER FOR LIPID SCREENING FOR CARDIOVASCULAR DISEASE: ICD-10-CM

## 2025-04-03 DIAGNOSIS — G89.29 CHRONIC ELBOW PAIN, RIGHT: ICD-10-CM

## 2025-04-03 PROBLEM — E78.5 HYPERLIPIDEMIA: Status: RESOLVED | Noted: 2023-06-06 | Resolved: 2025-04-03

## 2025-04-03 LAB
ALBUMIN SERPL BCP-MCNC: 4.3 G/DL (ref 3.5–5.2)
ALP SERPL-CCNC: 75 UNIT/L (ref 40–150)
ALT SERPL W/O P-5'-P-CCNC: 28 UNIT/L (ref 10–44)
ANION GAP (OHS): 10 MMOL/L (ref 8–16)
AST SERPL-CCNC: 27 UNIT/L (ref 11–45)
BILIRUB SERPL-MCNC: 0.5 MG/DL (ref 0.1–1)
BUN SERPL-MCNC: 21 MG/DL (ref 6–20)
CALCIUM SERPL-MCNC: 9.5 MG/DL (ref 8.7–10.5)
CHLORIDE SERPL-SCNC: 104 MMOL/L (ref 95–110)
CHOLEST SERPL-MCNC: 220 MG/DL (ref 120–199)
CHOLEST/HDLC SERPL: 3.9 {RATIO} (ref 2–5)
CO2 SERPL-SCNC: 26 MMOL/L (ref 23–29)
CREAT SERPL-MCNC: 1 MG/DL (ref 0.5–1.4)
GFR SERPLBLD CREATININE-BSD FMLA CKD-EPI: >60 ML/MIN/1.73/M2
GLUCOSE SERPL-MCNC: 82 MG/DL (ref 70–110)
HDLC SERPL-MCNC: 57 MG/DL (ref 40–75)
HDLC SERPL: 25.9 % (ref 20–50)
LDLC SERPL CALC-MCNC: 151.4 MG/DL (ref 63–159)
NONHDLC SERPL-MCNC: 163 MG/DL
POTASSIUM SERPL-SCNC: 4.5 MMOL/L (ref 3.5–5.1)
PROT SERPL-MCNC: 7.5 GM/DL (ref 6–8.4)
SODIUM SERPL-SCNC: 140 MMOL/L (ref 136–145)
TRIGL SERPL-MCNC: 58 MG/DL (ref 30–150)

## 2025-04-03 PROCEDURE — 80053 COMPREHEN METABOLIC PANEL: CPT | Performed by: FAMILY MEDICINE

## 2025-04-03 PROCEDURE — 99999 PR PBB SHADOW E&M-EST. PATIENT-LVL V: CPT | Mod: PBBFAC,,, | Performed by: FAMILY MEDICINE

## 2025-04-03 PROCEDURE — 36415 COLL VENOUS BLD VENIPUNCTURE: CPT | Performed by: FAMILY MEDICINE

## 2025-04-03 PROCEDURE — 80061 LIPID PANEL: CPT | Performed by: FAMILY MEDICINE

## 2025-04-03 PROCEDURE — 84153 ASSAY OF PSA TOTAL: CPT | Performed by: FAMILY MEDICINE

## 2025-04-03 RX ORDER — CICLOPIROX 80 MG/ML
SOLUTION TOPICAL NIGHTLY
Qty: 6.6 ML | Refills: 2 | Status: SHIPPED | OUTPATIENT
Start: 2025-04-03 | End: 2025-07-02

## 2025-04-03 RX ORDER — TERBINAFINE HYDROCHLORIDE 250 MG/1
250 TABLET ORAL DAILY
Qty: 30 TABLET | Refills: 0 | Status: SHIPPED | OUTPATIENT
Start: 2025-04-03 | End: 2025-05-03

## 2025-04-03 NOTE — PROGRESS NOTES
ANNUAL WELLNESS VISIT (PREVENTIVE SERVICES)  4/3/25  9:40 AM CDT    CHIEF COMPLAINT: Establish Care    HEALTH MAINTENANCE INTERVENTIONS - UP TO DATE  Health Maintenance Topics with due status: Not Due       Topic Last Completion Date    TETANUS VACCINE 03/24/2021    Hemoglobin A1c (Diabetic Prevention Screening) 06/06/2023    Lipid Panel 06/06/2023    Colorectal Cancer Screening 10/25/2024    RSV Vaccine (Age 60+ and Pregnant patients) Not Due     HEALTH MAINTENANCE INTERVENTIONS - DUE OR DUE SOON  Health Maintenance Due   Topic Date Due    Shingles Vaccine (1 of 2) Never done    Pneumococcal Vaccines (Age 50+) (1 of 1 - PCV) Never done    Influenza Vaccine (1) 09/01/2024    COVID-19 Vaccine (3 - 2024-25 season) 09/01/2024     Social Drivers of Health     Tobacco Use: Low Risk  (4/3/2025)    Patient History     Smoking Tobacco Use: Never     Smokeless Tobacco Use: Never     Passive Exposure: Not on file   Alcohol Use: Alcohol Misuse (3/28/2025)    AUDIT-C     Frequency of Alcohol Consumption: 2-4 times a month     Average Number of Drinks: 3 or 4     Frequency of Binge Drinking: Less than monthly   Financial Resource Strain: Low Risk  (3/28/2025)    Overall Financial Resource Strain (CARDIA)     Difficulty of Paying Living Expenses: Not hard at all   Food Insecurity: No Food Insecurity (3/28/2025)    Hunger Vital Sign     Worried About Running Out of Food in the Last Year: Never true     Ran Out of Food in the Last Year: Never true   Transportation Needs: No Transportation Needs (3/28/2025)    PRAPARE - Transportation     Lack of Transportation (Medical): No     Lack of Transportation (Non-Medical): No   Physical Activity: Sufficiently Active (3/28/2025)    Exercise Vital Sign     Days of Exercise per Week: 5 days     Minutes of Exercise per Session: 60 min   Stress: No Stress Concern Present (3/28/2025)    [employer] [place] [employer] - Occupational Stress Questionnaire     Feeling of Stress : Only a  little   Housing Stability: Low Risk  (3/28/2025)    Housing Stability Vital Sign     Unable to Pay for Housing in the Last Year: No     Number of Times Moved in the Last Year: 0     Homeless in the Last Year: No   Depression: Low Risk  (4/3/2025)    Depression     Last PHQ-4: Flowsheet Data: 0   Utilities: Not At Risk (3/28/2025)    [employer] Utilities     Threatened with loss of utilities: No   Health Literacy: Adequate Health Literacy (3/28/2025)     Health Literacy     Frequency of need for help with medical instructions: Never   Social Isolation: Not on file     This is my first time treating Lenny.  Lenny is requesting that I assume the role of their primary care provider.    Lenny is here for annual wellness and preventive services visit.  Health maintenance care gaps reviewed; relevant closure actions recommended.  CDC-recommended immunizations advocated.  USPSTF-recommended screenings advocated.       Lenny is also here for evaluation and management of unrelated problems reported separately.      1. Preventative health care  -     PSA, Screening  -     Comprehensive Metabolic Panel  -     Lipid Panel    2. Encounter for lipid screening for cardiovascular disease  -     Lipid Panel    3. Screening for diabetes mellitus  -     Comprehensive Metabolic Panel    4. Screening PSA (prostate specific antigen)  -     PSA, Screening    5. Chronic elbow pain, right  -     Ambulatory referral/consult to Orthopedics; Future; Expected date: 04/10/2025  -     X-Ray Elbow Complete 3 view Right; Future; Expected date: 04/03/2025    6. Onychomycosis of toenail  -     terbinafine HCL (LAMISIL) 250 mg tablet; Take 1 tablet (250 mg total) by mouth once daily.  Dispense: 30 tablet; Refill: 0  -     ciclopirox (PENLAC) 8 % Soln; Apply topically nightly. Continue for three months.  Dispense: 6.6 mL; Refill: 2    7. Pure hypercholesterolemia    8. Family history of early CAD  Overview:  CARDIOLOGIST: Gerry Flores MD  Father with  "MI in his 30s.       9. Need for vaccination  -     varicella-zoster [id number], PF, (SHINGRIX) 50 mcg/0.5 mL injection; Inject 0.5 mL (one dose) into muscle now; schedule second dose  days later  Dispense: 1 each; Refill: 1         Review of Systems   Respiratory:  Negative for chest tightness and shortness of breath.    Cardiovascular:  Negative for chest pain.   Endocrine: Negative for polydipsia and polyuria.       Vitals:    04/03/25 1002   BP: 116/86   BP Location: Left arm   Patient Position: Sitting   Pulse: 96   Resp: 18   Temp: 98 °F (36.7 °C)   TempSrc: Oral   SpO2: 98%   Weight: 72 kg (158 lb 11.7 oz)   Height: 5' 6" (1.676 m)   Physical Exam  Vitals reviewed.   Constitutional:       General: He is not in acute distress.     Appearance: Normal appearance. He is not ill-appearing, toxic-appearing or diaphoretic.   HENT:      Head: Normocephalic and atraumatic.      Right Ear: Tympanic membrane, ear canal and external ear normal.      Left Ear: Tympanic membrane, ear canal and external ear normal.   Eyes:      General: No scleral icterus.     Conjunctiva/sclera: Conjunctivae normal.   Neck:      Vascular: No carotid bruit.   Cardiovascular:      Rate and Rhythm: Normal rate and regular rhythm.      Heart sounds: Normal heart sounds.   Pulmonary:      Effort: Pulmonary effort is normal.      Breath sounds: Normal breath sounds.   Abdominal:      General: Bowel sounds are normal. There is no distension.      Palpations: Abdomen is soft. There is no mass.      Tenderness: There is no abdominal tenderness.   Musculoskeletal:         General: No swelling, tenderness or deformity. Normal range of motion.      Cervical back: No muscular tenderness.   Lymphadenopathy:      Cervical: No cervical adenopathy.   Skin:     General: Skin is warm and dry.      Coloration: Skin is not jaundiced.   Neurological:      General: No focal deficit present.      Mental Status: He is alert and oriented to person, place, and " time.      Cranial Nerves: No cranial nerve deficit.      Gait: Gait normal.   Psychiatric:         Mood and Affect: Mood normal.         Behavior: Behavior normal.         Judgment: Judgment normal.         ADDITIONAL E&M SERVICES PROVIDED - UNRELATED TO PREVENTIVE SERVICES  4/3/25  9:40 AM CDT    In addition to and unrelated to the preventive services provided this date, the following condition(s) were also evaluated and managed.    History of Present Illness      He's very active, going to the gym multiple days per week and exercising regularly, including strength and cardio. He was playing pickleball regularly until about three months ago when he sustained an injury to his right elbow. It sounds like tennis elbow, but there are also aspects that suggest possible ulnar nerve entrapment. He says he's 90% better, given that it has been going on for so long. I recommended an orthopedic consultation. He declined but said he would see an orthopedist if it did not completely resolve and he was not able to successfully resume playing pickleball. I entered orders for an X-ray and referral, and he can schedule them at his convenience.    He has a family history of premature coronary artery disease. He has seen outside cardiologists and said his coronary calcium CT score was six, which is within the very good range. He has hypercholesterolemia. He may have previously been treated with rosuvastatin, but he's been off the rosuvastatin for over a year.    He reports that he has culture-confirmed onychomycosis of the toenails of his right foot, treated years ago by a dermatologist with terbinafine, with near but incomplete resolution. He has tried topical treatments over the last year and found them ineffective. We discussed the risks and benefits of treatment options, and it was agreed to combine topical and systemic antifungals. He will let me know if this fails to resolve the problem.    Problem List Items Addressed This  "Visit       Family history of early CAD (Chronic)    Overview   CARDIOLOGIST: Gerry Flores MD  Father with MI in his 30s.          Onychomycosis of toenail    Relevant Medications    terbinafine HCL (LAMISIL) 250 mg tablet    ciclopirox (PENLAC) 8 % Soln    Pure hypercholesterolemia (Chronic)     Other Visit Diagnoses         Preventative health care    -  Primary    Relevant Orders    PSA, Screening    Comprehensive Metabolic Panel    Lipid Panel      Encounter for lipid screening for cardiovascular disease        Relevant Orders    Lipid Panel      Screening for diabetes mellitus        Relevant Orders    Comprehensive Metabolic Panel      Screening PSA (prostate specific antigen)        Relevant Orders    PSA, Screening      Chronic elbow pain, right        Relevant Orders    Ambulatory referral/consult to Orthopedics    X-Ray Elbow Complete 3 view Right      Need for vaccination        Relevant Medications    varicella-zoster [id number], PF, (SHINGRIX) 50 mcg/0.5 mL injection          Vitals:    04/03/25 1002   BP: 116/86   BP Location: Left arm   Patient Position: Sitting   Pulse: 96   Resp: 18   Temp: 98 °F (36.7 °C)   TempSrc: Oral   SpO2: 98%   Weight: 72 kg (158 lb 11.7 oz)   Height: 5' 6" (1.676 m)   Physical Exam  Vitals reviewed.   Constitutional:       General: He is not in acute distress.     Appearance: Normal appearance. He is not ill-appearing, toxic-appearing or diaphoretic.   HENT:      Head: Normocephalic and atraumatic.      Right Ear: Tympanic membrane, ear canal and external ear normal.      Left Ear: Tympanic membrane, ear canal and external ear normal.   Eyes:      General: No scleral icterus.     Conjunctiva/sclera: Conjunctivae normal.   Neck:      Vascular: No carotid bruit.   Cardiovascular:      Rate and Rhythm: Normal rate and regular rhythm.      Heart sounds: Normal heart sounds.   Pulmonary:      Effort: Pulmonary effort is normal.      Breath sounds: Normal breath sounds. " "  Abdominal:      General: Bowel sounds are normal. There is no distension.      Palpations: Abdomen is soft. There is no mass.      Tenderness: There is no abdominal tenderness.   Musculoskeletal:         General: No swelling, tenderness or deformity. Normal range of motion.      Cervical back: No muscular tenderness.   Lymphadenopathy:      Cervical: No cervical adenopathy.   Skin:     General: Skin is warm and dry.      Coloration: Skin is not jaundiced.   Neurological:      General: No focal deficit present.      Mental Status: He is alert and oriented to person, place, and time.      Cranial Nerves: No cranial nerve deficit.      Gait: Gait normal.   Psychiatric:         Mood and Affect: Mood normal.         Behavior: Behavior normal.         Judgment: Judgment normal.     Follow up in about 1 year (around 4/3/2026) for office visit, annual exam.    This note was generated with the assistance of ambient listening technology. Verbal consent was obtained by the patient and accompanying visitor(s) for the recording of patient appointment to facilitate this note. I attest to having reviewed and edited the generated note for accuracy, though some syntax or spelling errors may persist. Please contact the author of this note for any clarification.    Documentation entered by me for this encounter may have been done in part using speech-recognition technology. Although I have made an effort to ensure accuracy, "sound like" errors may exist and should be interpreted in context.  "

## 2025-04-04 PROBLEM — E78.49 OTHER HYPERLIPIDEMIA: Status: ACTIVE | Noted: 2025-04-03

## 2025-04-04 LAB — PSA SERPL-MCNC: 1.24 NG/ML

## 2025-04-05 ENCOUNTER — RESULTS FOLLOW-UP (OUTPATIENT)
Dept: INTERNAL MEDICINE | Facility: CLINIC | Age: 55
End: 2025-04-05

## 2025-05-06 ENCOUNTER — LAB VISIT (OUTPATIENT)
Dept: LAB | Facility: HOSPITAL | Age: 55
End: 2025-05-06
Attending: FAMILY MEDICINE
Payer: COMMERCIAL

## 2025-05-06 DIAGNOSIS — Z79.899 ENCOUNTER FOR LONG-TERM CURRENT USE OF MEDICATION: ICD-10-CM

## 2025-05-06 LAB
ALBUMIN SERPL BCP-MCNC: 4 G/DL (ref 3.5–5.2)
ALP SERPL-CCNC: 62 UNIT/L (ref 40–150)
ALT SERPL W/O P-5'-P-CCNC: 29 UNIT/L (ref 10–44)
AST SERPL-CCNC: 30 UNIT/L (ref 11–45)
BILIRUB DIRECT SERPL-MCNC: 0.1 MG/DL (ref 0.1–0.3)
BILIRUB SERPL-MCNC: 0.4 MG/DL (ref 0.1–1)
PROT SERPL-MCNC: 6.6 GM/DL (ref 6–8.4)

## 2025-05-06 PROCEDURE — 82040 ASSAY OF SERUM ALBUMIN: CPT

## 2025-05-06 PROCEDURE — 36415 COLL VENOUS BLD VENIPUNCTURE: CPT

## 2025-05-29 DIAGNOSIS — B35.1 ONYCHOMYCOSIS OF TOENAIL: ICD-10-CM

## 2025-05-29 NOTE — TELEPHONE ENCOUNTER
Refill Routing Note   Medication(s) are not appropriate for processing by [facility] for the following reason(s):        Outside of protocol: Terbinafine    [facility] action(s):  Route             Appointments  past 12m or future 3m with PCP    Date Provider   Last Visit   4/3/2025 RENATE Benz MD   Next Visit   Visit date not found RENATE Benz MD   ED visits in past 90 days: 0        Note composed:9:34 AM 05/29/2025

## 2025-05-30 RX ORDER — TERBINAFINE HYDROCHLORIDE 250 MG/1
250 TABLET ORAL DAILY
Qty: 30 TABLET | Refills: 0 | Status: SHIPPED | OUTPATIENT
Start: 2025-05-30

## 2025-05-30 NOTE — TELEPHONE ENCOUNTER
REFILL REQUEST APPROVED.  Requested Prescriptions     Pending Prescriptions Disp Refills    terbinafine HCL (LAMISIL) 250 mg tablet [Pharmacy Med Name: TERBINAFINE  MG TABLET] 30 tablet 0     Sig: Take 1 tablet (250 mg total) by mouth once daily.

## 2025-06-26 DIAGNOSIS — B35.1 ONYCHOMYCOSIS OF TOENAIL: ICD-10-CM

## 2025-06-26 NOTE — TELEPHONE ENCOUNTER
Refill Routing Note   Medication(s) are not appropriate for processing by [facility] for the following reason(s):        Outside of protocol    [facility] action(s):  Route               Appointments  past 12m or future 3m with PCP    Date Provider   Last Visit   4/3/2025 RENATE Benz MD   Next Visit   Visit date not found RENATE Benz MD   ED visits in past 90 days: 0        Note composed:1:59 PM 06/26/2025

## 2025-07-01 RX ORDER — TERBINAFINE HYDROCHLORIDE 250 MG/1
250 TABLET ORAL
Qty: 30 TABLET | Refills: 0 | OUTPATIENT
Start: 2025-07-01

## 2025-07-01 NOTE — TELEPHONE ENCOUNTER
Dear Lenny,    I did not refill your antifungal medication because a three-month course of therapy with terbinafine (Lamisil) is considered a full treatment for toenail fungal infections. Its important to know that even after finishing the medication, it can take a year or longer for a new, healthy nail to fully grow out and replace the damaged nail. No further medication is needed at this time, but please let me know if I can do anything else for you.    Best regards,    Dr. TAFOYA

## 2025-08-18 ENCOUNTER — HOSPITAL ENCOUNTER (OUTPATIENT)
Dept: RADIOLOGY | Facility: HOSPITAL | Age: 55
Discharge: HOME | End: 2025-08-18
Attending: FAMILY MEDICINE
Payer: COMMERCIAL

## 2025-08-18 ENCOUNTER — OFFICE VISIT (OUTPATIENT)
Dept: INTERNAL MEDICINE | Facility: CLINIC | Age: 55
End: 2025-08-18
Payer: COMMERCIAL

## 2025-08-18 VITALS
WEIGHT: 160.5 LBS | BODY MASS INDEX: 25.79 KG/M2 | TEMPERATURE: 98 F | HEART RATE: 80 BPM | SYSTOLIC BLOOD PRESSURE: 116 MMHG | DIASTOLIC BLOOD PRESSURE: 80 MMHG | HEIGHT: 66 IN | OXYGEN SATURATION: 97 %

## 2025-08-18 DIAGNOSIS — R05.3 CHRONIC COUGH: Primary | ICD-10-CM

## 2025-08-18 DIAGNOSIS — E78.49 OTHER HYPERLIPIDEMIA: Chronic | ICD-10-CM

## 2025-08-18 DIAGNOSIS — R39.13 SPLIT URINARY STREAM: Chronic | ICD-10-CM

## 2025-08-18 DIAGNOSIS — R05.3 CHRONIC COUGH: ICD-10-CM

## 2025-08-18 DIAGNOSIS — R05.8 POST-VIRAL COUGH SYNDROME: ICD-10-CM

## 2025-08-18 LAB
BACTERIA #/AREA URNS HPF: NORMAL /HPF
BILIRUB UR QL STRIP.AUTO: NEGATIVE
CLARITY UR: CLEAR
COLOR UR AUTO: YELLOW
GLUCOSE UR QL STRIP: NEGATIVE
HGB UR QL STRIP: NEGATIVE
KETONES UR QL STRIP: NEGATIVE
LEUKOCYTE ESTERASE UR QL STRIP: ABNORMAL
MICROSCOPIC COMMENT: NORMAL
NITRITE UR QL STRIP: NEGATIVE
PH UR STRIP: 7 [PH]
PROT UR QL STRIP: NEGATIVE
RBC #/AREA URNS HPF: 2 /HPF (ref 0–4)
SP GR UR STRIP: 1.01
WBC #/AREA URNS HPF: 1 /HPF (ref 0–5)

## 2025-08-18 PROCEDURE — 3008F BODY MASS INDEX DOCD: CPT | Mod: CPTII,S$GLB,, | Performed by: FAMILY MEDICINE

## 2025-08-18 PROCEDURE — 3074F SYST BP LT 130 MM HG: CPT | Mod: CPTII,S$GLB,, | Performed by: FAMILY MEDICINE

## 2025-08-18 PROCEDURE — 99214 OFFICE O/P EST MOD 30 MIN: CPT | Mod: S$GLB,,, | Performed by: FAMILY MEDICINE

## 2025-08-18 PROCEDURE — 71046 X-RAY EXAM CHEST 2 VIEWS: CPT | Mod: TC

## 2025-08-18 PROCEDURE — G2211 COMPLEX E/M VISIT ADD ON: HCPCS | Mod: S$GLB,,, | Performed by: FAMILY MEDICINE

## 2025-08-18 PROCEDURE — 81003 URINALYSIS AUTO W/O SCOPE: CPT | Performed by: FAMILY MEDICINE

## 2025-08-18 PROCEDURE — 71046 X-RAY EXAM CHEST 2 VIEWS: CPT | Mod: 26,,, | Performed by: STUDENT IN AN ORGANIZED HEALTH CARE EDUCATION/TRAINING PROGRAM

## 2025-08-18 PROCEDURE — 1159F MED LIST DOCD IN RCRD: CPT | Mod: CPTII,S$GLB,, | Performed by: FAMILY MEDICINE

## 2025-08-18 PROCEDURE — 3079F DIAST BP 80-89 MM HG: CPT | Mod: CPTII,S$GLB,, | Performed by: FAMILY MEDICINE

## 2025-08-18 PROCEDURE — 99999 PR PBB SHADOW E&M-EST. PATIENT-LVL IV: CPT | Mod: PBBFAC,,, | Performed by: FAMILY MEDICINE

## 2025-08-18 PROCEDURE — 1160F RVW MEDS BY RX/DR IN RCRD: CPT | Mod: CPTII,S$GLB,, | Performed by: FAMILY MEDICINE

## 2025-08-18 RX ORDER — ATORVASTATIN CALCIUM 20 MG/1
TABLET, FILM COATED ORAL
COMMUNITY
End: 2025-08-18 | Stop reason: SDUPTHER

## 2025-08-18 RX ORDER — METRONIDAZOLE 500 MG/1
2000 TABLET ORAL
COMMUNITY
Start: 2025-08-07 | End: 2025-08-18

## 2025-08-18 RX ORDER — CEFIXIME 400 MG/1
CAPSULE ORAL
COMMUNITY
Start: 2025-08-07 | End: 2025-08-18

## 2025-08-18 RX ORDER — AZITHROMYCIN 250 MG/1
TABLET, FILM COATED ORAL
COMMUNITY
Start: 2025-07-21 | End: 2025-08-18

## 2025-08-18 RX ORDER — ATORVASTATIN CALCIUM 20 MG/1
20 TABLET, FILM COATED ORAL NIGHTLY
Start: 2025-08-18

## 2025-08-18 RX ORDER — ALBUTEROL SULFATE 90 UG/1
INHALANT RESPIRATORY (INHALATION)
COMMUNITY
Start: 2025-08-01 | End: 2025-08-18 | Stop reason: SDUPTHER

## 2025-08-18 RX ORDER — ALBUTEROL SULFATE 90 UG/1
2 INHALANT RESPIRATORY (INHALATION) EVERY 6 HOURS PRN
Qty: 8 G | Refills: 1 | Status: SHIPPED | OUTPATIENT
Start: 2025-08-18 | End: 2026-08-18

## 2025-08-18 RX ORDER — DOXYCYCLINE 100 MG/1
100 CAPSULE ORAL EVERY 12 HOURS
COMMUNITY
Start: 2025-08-07 | End: 2025-08-18

## 2025-08-18 RX ORDER — TRAZODONE HYDROCHLORIDE 50 MG/1
25-50 TABLET ORAL DAILY PRN
COMMUNITY
Start: 2025-07-21 | End: 2025-08-18

## 2025-08-19 PROBLEM — R39.13 SPLIT URINARY STREAM: Status: ACTIVE | Noted: 2025-08-19
